# Patient Record
Sex: MALE | Race: WHITE | NOT HISPANIC OR LATINO | Employment: STUDENT | URBAN - METROPOLITAN AREA
[De-identification: names, ages, dates, MRNs, and addresses within clinical notes are randomized per-mention and may not be internally consistent; named-entity substitution may affect disease eponyms.]

---
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
## 2022-04-07 ENCOUNTER — HISTORICAL (OUTPATIENT)
Dept: ADMINISTRATIVE | Facility: HOSPITAL | Age: 22
End: 2022-04-07

## 2022-04-23 VITALS
WEIGHT: 180 LBS | BODY MASS INDEX: 23.86 KG/M2 | HEIGHT: 73 IN | SYSTOLIC BLOOD PRESSURE: 108 MMHG | DIASTOLIC BLOOD PRESSURE: 70 MMHG

## 2022-09-03 ENCOUNTER — HOSPITAL ENCOUNTER (EMERGENCY)
Facility: OTHER | Age: 22
Discharge: HOME | End: 2022-09-03
Attending: EMERGENCY MEDICINE
Payer: MEDICAID

## 2022-09-03 VITALS
TEMPERATURE: 98 F | HEIGHT: 73 IN | OXYGEN SATURATION: 97 % | WEIGHT: 195 LBS | SYSTOLIC BLOOD PRESSURE: 121 MMHG | BODY MASS INDEX: 25.84 KG/M2 | DIASTOLIC BLOOD PRESSURE: 69 MMHG | HEART RATE: 72 BPM | RESPIRATION RATE: 16 BRPM

## 2022-09-03 DIAGNOSIS — I86.1 LEFT VARICOCELE: ICD-10-CM

## 2022-09-03 DIAGNOSIS — N43.3 HYDROCELE IN ADULT: Primary | ICD-10-CM

## 2022-09-03 DIAGNOSIS — R52 PAIN: ICD-10-CM

## 2022-09-03 LAB
BILIRUB UR QL STRIP: NEGATIVE
CLARITY UR: CLEAR
COLOR UR: YELLOW
GLUCOSE UR QL STRIP: NEGATIVE
HCV AB SERPL QL IA: NEGATIVE
HGB UR QL STRIP: NEGATIVE
HIV 1+2 AB+HIV1 P24 AG SERPL QL IA: NEGATIVE
KETONES UR QL STRIP: NEGATIVE
LEUKOCYTE ESTERASE UR QL STRIP: NEGATIVE
NITRITE UR QL STRIP: NEGATIVE
PH UR STRIP: 7 [PH] (ref 5–8)
PROT UR QL STRIP: NEGATIVE
SP GR UR STRIP: 1.02 (ref 1–1.03)
URN SPEC COLLECT METH UR: NORMAL
UROBILINOGEN UR STRIP-ACNC: NEGATIVE EU/DL

## 2022-09-03 PROCEDURE — 25000003 PHARM REV CODE 250: Performed by: PHYSICIAN ASSISTANT

## 2022-09-03 PROCEDURE — 87389 HIV-1 AG W/HIV-1&-2 AB AG IA: CPT | Performed by: EMERGENCY MEDICINE

## 2022-09-03 PROCEDURE — 81003 URINALYSIS AUTO W/O SCOPE: CPT | Performed by: PHYSICIAN ASSISTANT

## 2022-09-03 PROCEDURE — 99284 EMERGENCY DEPT VISIT MOD MDM: CPT | Mod: 25

## 2022-09-03 PROCEDURE — 87591 N.GONORRHOEAE DNA AMP PROB: CPT | Performed by: PHYSICIAN ASSISTANT

## 2022-09-03 PROCEDURE — 86803 HEPATITIS C AB TEST: CPT | Performed by: EMERGENCY MEDICINE

## 2022-09-03 PROCEDURE — 87491 CHLMYD TRACH DNA AMP PROBE: CPT | Performed by: PHYSICIAN ASSISTANT

## 2022-09-03 RX ORDER — DIAZEPAM 5 MG/1
5 TABLET ORAL
Status: COMPLETED | OUTPATIENT
Start: 2022-09-03 | End: 2022-09-03

## 2022-09-03 RX ORDER — IBUPROFEN 600 MG/1
600 TABLET ORAL EVERY 6 HOURS PRN
Qty: 20 TABLET | Refills: 0 | Status: SHIPPED | OUTPATIENT
Start: 2022-09-03

## 2022-09-03 RX ORDER — KETOROLAC TROMETHAMINE 10 MG/1
10 TABLET, FILM COATED ORAL
Status: COMPLETED | OUTPATIENT
Start: 2022-09-03 | End: 2022-09-03

## 2022-09-03 RX ADMIN — KETOROLAC TROMETHAMINE 10 MG: 10 TABLET, FILM COATED ORAL at 07:09

## 2022-09-03 RX ADMIN — DIAZEPAM 5 MG: 5 TABLET ORAL at 07:09

## 2022-09-03 NOTE — Clinical Note
"Claude "Claude" Hebert was seen and treated in our emergency department on 9/3/2022.  He may return to school on 09/05/2022.      If you have any questions or concerns, please don't hesitate to call.      JEANETTE Palomo"

## 2022-09-04 NOTE — ED PROVIDER NOTES
Encounter Date: 9/3/2022       History     Chief Complaint   Patient presents with    Testicle Pain     Pt advised around an hour ago he was sitting on his couch and started to have discomfort in his right testicle - pt denies any swelling or painful urination -      22-year-old male presents to the ED for evaluation of testicular pain.  Patient states pain is located to the right testicular region.  Denies any recent trauma.  He states pain was worsened with certain movements however denies any significant movement precipitating it.  Does report that he had recent chlamydia infection approximately 4-5 weeks ago which she underwent treatment for.  Denies any penile discharge or new intercourse or concern of STI at this time.  Does report on occasion for years he has had some occasional ureteral irritation and occasional penile or testicular discomfort after ejaculation.  Denies any reflux.  Denies any known swelling, fever or chills.    The history is provided by the patient.   Review of patient's allergies indicates:   Allergen Reactions    Penicillanic sulfone bl beta-lactamase inhibitors Hives     No past medical history on file.  No past surgical history on file.  No family history on file.     Review of Systems   Constitutional:  Negative for activity change, appetite change, chills and fever.   HENT:  Negative for sore throat.    Respiratory:  Negative for shortness of breath.    Cardiovascular:  Negative for chest pain.   Gastrointestinal:  Negative for abdominal pain, nausea and vomiting.   Genitourinary:  Positive for dysuria and testicular pain. Negative for decreased urine volume, difficulty urinating, flank pain, hematuria, penile discharge, penile pain, penile swelling and urgency.   Musculoskeletal:  Negative for arthralgias, back pain and myalgias.   Skin:  Negative for pallor and rash.   Neurological:  Negative for weakness.   Hematological:  Does not bruise/bleed easily.   Psychiatric/Behavioral:   The patient is nervous/anxious.      Physical Exam     Initial Vitals [09/03/22 1902]   BP Pulse Resp Temp SpO2   (!) 145/91 84 18 98.4 °F (36.9 °C) 99 %      MAP       --         Physical Exam    Nursing note and vitals reviewed.  Constitutional: Vital signs are normal. He appears well-developed and well-nourished. He is cooperative.  Non-toxic appearance. He does not appear ill. He appears distressed.   HENT:   Head: Normocephalic and atraumatic.   Eyes: Conjunctivae and lids are normal.   Neck: Trachea normal. Neck supple. No stridor present. No tracheal deviation present.   Normal range of motion.  Cardiovascular:  Normal rate and regular rhythm.           Pulmonary/Chest: No respiratory distress. He has no wheezes.   Abdominal: Abdomen is soft.   Genitourinary: Cremasteric reflex is not absent on the right side. Cremasteric reflex is not absent on the left side.    Genitourinary Comments: Mild TTP of the right testicle.  No abscess or lesions noted.  Left varicocele appreciated     Musculoskeletal:      Cervical back: Normal range of motion and neck supple.     Neurological: He is alert and oriented to person, place, and time. He has normal strength. GCS score is 15. GCS eye subscore is 4. GCS verbal subscore is 5. GCS motor subscore is 6.   Skin: Skin is warm, dry and intact. No rash noted.   Psychiatric: His speech is normal and behavior is normal. Thought content normal. His mood appears anxious.     ED Course   Procedures  Labs Reviewed   C. TRACHOMATIS/N. GONORRHOEAE BY AMP DNA   HIV 1 / 2 ANTIBODY    Narrative:     Release to patient->Immediate   HEPATITIS C ANTIBODY    Narrative:     Release to patient->Immediate   URINALYSIS, REFLEX TO URINE CULTURE    Narrative:     Specimen Source->Urine          Imaging Results              US Scrotum And Testicles (Final result)  Result time 09/03/22 20:14:52      Final result by Gurdeep Whitman MD (09/03/22 20:14:52)                   Impression:      Small  simple hydrocele on the right.    Moderate varicocele on the left.    No evidence of torsion or epididymal orchitis.      Electronically signed by: Gurdeep Whitman  Date:    09/03/2022  Time:    20:14               Narrative:    EXAMINATION:  US SCROTUM AND TESTICLES    CLINICAL HISTORY:  Pain, unspecified    TECHNIQUE:  Sonography of the scrotum and testes.    COMPARISON:  None.    FINDINGS:  Right Testicle:    *Size: 4 x 2.5 x 2.5 cm  *Appearance: Normal.  *Flow: Normal arterial and venous flow  *Epididymis: Normal.  *Hydrocele: Small simple.  *Varicocele: None.  .    Left Testicle:    *Size: 4.2 x 2.4 x 2.3 cm  *Appearance: Normal.  *Flow: Normal arterial and venous flow  *Epididymis: Normal.  *Hydrocele: None.  *Varicocele: Moderate.  .    Other findings: None.                                       Medications   ketorolac tablet 10 mg (10 mg Oral Given 9/3/22 1950)   diazePAM tablet 5 mg (5 mg Oral Given 9/3/22 1951)     Medical Decision Making:   History:   Old Medical Records: I decided to obtain old medical records.  Initial Assessment:   Emergent evaluation 22-year-old male presenting with acute right-sided testicular pain that began shortly before arrival.  He appears anxious and in distress secondary to pain.  Nontoxic.  Abdomen soft nontender.   exam reveals intact cremasteric reflex.  Left sided variceal appreciated.  Mild TTP of the right testicular region.  No genital lesion or penile discharge  Differential Diagnosis:   Differential Diagnosis includes, but is not limited to:  STI, urethritis, testicular/appendix torsion, epididymitis, orchitis, UTI, kidney stone, urinary retention, appendicitis, prostatitis, testicular mass/neoplasm, incarcerated/strangulated hernia.    Clinical Tests:   Lab Tests: Reviewed and Ordered  Radiological Study: Reviewed and Ordered  ED Management:  Plan for UA, G/C and testicular ultrasound.  Given his anxious appearance he was offered Valium and Toradol with marked  improvement pain.  UA unremarkable.  G/C pending however given recent completion of doxycycline for chlamydia down 1 month ago felt lower suspicion as no new intercourse or sexual partners.  Will continue to follow.  Ultrasound reveals hydrocele and varicocele.  Discussed likely the etiology of symptoms and will give referral to Urology.  No signs of torsion at this time.  Cautioned on prompt return for any new or worsening symptoms. Strict instructions to follow up with primary care physician or reference provided for further assessment and evaluation. Given instructions to return for any acute symptoms and verbalized understanding of this medical plan.                      Clinical Impression:   Final diagnoses:  [R52] Pain  [N43.3] Hydrocele in adult (Primary)  [I86.1] Left varicocele        ED Disposition Condition    Discharge Stable          ED Prescriptions       Medication Sig Dispense Start Date End Date Auth. Provider    ibuprofen (ADVIL,MOTRIN) 600 MG tablet Take 1 tablet (600 mg total) by mouth every 6 (six) hours as needed for Pain. 20 tablet 9/3/2022 -- JEANETTE Palomo          Follow-up Information       Follow up With Specialties Details Why Contact Info Additional Information    [facility] Schedule an appointment as soon as possible for a visit   [address] [place] [address], [address]  [facility] [address] [phone] Urology - [facility], 6th Floor, [address] Patients seeing Dr. Hall, please check in at [facility] [address]. Please park in the [place]. Please come with a full bladder to in office visit to provide a urine specimen when you arrive.             JEANETTE Palomo  09/04/22 [id number]

## 2022-09-05 LAB
C TRACH DNA SPEC QL NAA+PROBE: NOT DETECTED
N GONORRHOEA DNA SPEC QL NAA+PROBE: NOT DETECTED